# Patient Record
Sex: FEMALE | URBAN - METROPOLITAN AREA
[De-identification: names, ages, dates, MRNs, and addresses within clinical notes are randomized per-mention and may not be internally consistent; named-entity substitution may affect disease eponyms.]

---

## 2023-03-22 ENCOUNTER — TELEPHONE (OUTPATIENT)
Dept: FAMILY MEDICINE CLINIC | Facility: CLINIC | Age: 23
End: 2023-03-22

## 2023-03-22 NOTE — TELEPHONE ENCOUNTER
I attempted to reach patient in regards to a referral that we had received from [facility] (which has been scanned into her chart). The number rang with no answer and no vm to leave a message on.